# Patient Record
Sex: MALE | Race: WHITE | NOT HISPANIC OR LATINO | URBAN - METROPOLITAN AREA
[De-identification: names, ages, dates, MRNs, and addresses within clinical notes are randomized per-mention and may not be internally consistent; named-entity substitution may affect disease eponyms.]

---

## 2020-07-25 ENCOUNTER — TELEPHONE ENCOUNTER (OUTPATIENT)
Dept: URBAN - METROPOLITAN AREA CLINIC 13 | Facility: CLINIC | Age: 75
End: 2020-07-25

## 2020-07-25 RX ORDER — LEVOTHYROXINE SODIUM 0.1 MG/1
TAKE 1 TABLET DAILY TABLET ORAL
Refills: 0 | OUTPATIENT
Start: 2018-03-22 | End: 2019-06-20

## 2020-07-25 RX ORDER — MEXILETINE HYDROCHLORIDE 150 MG/1
TAKE 1 CAPSULE EVERY 8 HOURS DAILY CAPSULE ORAL
Refills: 0 | OUTPATIENT
End: 2019-02-27

## 2020-07-25 RX ORDER — AZELASTINE HYDROCHLORIDE 137 UG/1
INSTILL 2 SPRAYS IN EACH NOSTRIL ONCE DAILY SPRAY, METERED NASAL
Refills: 0 | OUTPATIENT
Start: 2018-10-31 | End: 2019-02-27

## 2020-07-25 RX ORDER — DUREZOL 0.5 MG/ML
EMULSION OPHTHALMIC
Refills: 0 | OUTPATIENT
End: 2019-06-20

## 2020-07-25 RX ORDER — INSULIN GLARGINE 100 [IU]/ML
INJECTION, SOLUTION SUBCUTANEOUS
Refills: 0 | OUTPATIENT
End: 2019-02-27

## 2020-07-25 RX ORDER — OMEPRAZOLE 20 MG/1
TAKE ONE CAPSULE BY MOUTH TWICE A DAY CAPSULE, DELAYED RELEASE ORAL
Refills: 2 | OUTPATIENT
Start: 2018-09-05 | End: 2019-06-20

## 2020-07-25 RX ORDER — THYROID, PORCINE 90 MG/1
TAKE 1 TABLET DAILY TABLET ORAL
Refills: 0 | OUTPATIENT
End: 2019-02-27

## 2020-07-25 RX ORDER — AMIODARONE HYDROCHLORIDE 200 MG/1
TAKE 2 TABLET DAILY TABLET ORAL
Refills: 0 | OUTPATIENT
Start: 2018-11-30 | End: 2019-06-20

## 2020-07-25 RX ORDER — INSULIN LISPRO 100 [IU]/ML
INJECTION, SUSPENSION SUBCUTANEOUS
Refills: 0 | OUTPATIENT
End: 2019-02-27

## 2020-07-26 ENCOUNTER — TELEPHONE ENCOUNTER (OUTPATIENT)
Dept: URBAN - METROPOLITAN AREA CLINIC 13 | Facility: CLINIC | Age: 75
End: 2020-07-26

## 2020-07-26 RX ORDER — TRAMADOL HYDROCHLORIDE 50 MG/1
TABLET ORAL
Qty: 15 | Refills: 0 | Status: ACTIVE | COMMUNITY
Start: 2019-05-01

## 2020-07-26 RX ORDER — INSULIN DETEMIR 100 [IU]/ML
INJECT SUBCUTANEOUSLY AS DIRECTED INJECTION, SOLUTION SUBCUTANEOUS
Refills: 0 | Status: ACTIVE | COMMUNITY
Start: 2018-10-02

## 2020-07-26 RX ORDER — CITALOPRAM 40 MG/1
TABLET ORAL
Qty: 90 | Refills: 0 | Status: ACTIVE | COMMUNITY
Start: 2019-12-13

## 2020-07-26 RX ORDER — INSULIN LISPRO 100 [IU]/ML
USE AS DIRECTED INJECTION, SOLUTION INTRAVENOUS; SUBCUTANEOUS
Refills: 0 | Status: ACTIVE | COMMUNITY

## 2020-07-26 RX ORDER — PREDNISOLONE ACETATE 10 MG/ML
SUSPENSION/ DROPS OPHTHALMIC
Qty: 5 | Refills: 0 | Status: ACTIVE | COMMUNITY
Start: 2019-11-26

## 2020-07-26 RX ORDER — FUROSEMIDE 20 MG/1
TAKE 1 TABLET DAILY AS NEEDED TABLET ORAL
Refills: 0 | Status: ACTIVE | COMMUNITY
Start: 2019-06-05

## 2020-07-26 RX ORDER — CITALOPRAM 20 MG/1
TABLET ORAL
Qty: 30 | Refills: 0 | Status: ACTIVE | COMMUNITY
Start: 2019-04-17

## 2020-07-26 RX ORDER — BUPROPION HYDROCHLORIDE 150 MG/1
TABLET, FILM COATED, EXTENDED RELEASE ORAL
Qty: 30 | Refills: 0 | Status: ACTIVE | COMMUNITY
Start: 2019-04-18

## 2020-07-26 RX ORDER — DUREZOL 0.5 MG/ML
EMULSION OPHTHALMIC
Qty: 5 | Refills: 0 | Status: ACTIVE | COMMUNITY
Start: 2017-12-18

## 2020-07-26 RX ORDER — TRAMADOL HYDROCHLORIDE 50 MG/1
TAKE 1 TABLET BY MOUTH EVERY 6 HOURS AS NEEDED FOR PAIN TABLET ORAL
Qty: 60 | Refills: 1 | Status: ACTIVE | COMMUNITY

## 2020-07-26 RX ORDER — LISINOPRIL 10 MG/1
TAKE 1 TABLET DAILY FOR BLOOD PRESSURE TABLET ORAL
Refills: 0 | Status: ACTIVE | COMMUNITY
Start: 2018-05-10

## 2020-07-26 RX ORDER — LEVOTHYROXINE SODIUM 0.09 MG/1
TABLET ORAL
Qty: 90 | Refills: 0 | Status: ACTIVE | COMMUNITY
Start: 2018-03-22

## 2020-07-26 RX ORDER — OMEPRAZOLE 40 MG/1
TAKE (1) CAPSULE BY MOUTH DAILY CAPSULE, DELAYED RELEASE ORAL
Refills: 2 | Status: ACTIVE | COMMUNITY
Start: 2019-07-29

## 2020-07-26 RX ORDER — ERYTHROMYCIN 5 MG/G
OINTMENT OPHTHALMIC
Qty: 4 | Refills: 0 | Status: ACTIVE | COMMUNITY
Start: 2019-03-20

## 2020-07-26 RX ORDER — ONDANSETRON HYDROCHLORIDE 4 MG/1
TABLET, FILM COATED ORAL
Qty: 90 | Refills: 0 | Status: ACTIVE | COMMUNITY
Start: 2019-12-13

## 2020-07-26 RX ORDER — DOXYCYCLINE 100 MG/1
TABLET, FILM COATED ORAL
Qty: 60 | Refills: 0 | Status: ACTIVE | COMMUNITY
Start: 2018-07-27

## 2020-07-26 RX ORDER — PANTOPRAZOLE SODIUM 40 MG/1
TABLET, DELAYED RELEASE ORAL
Qty: 30 | Refills: 0 | Status: ACTIVE | COMMUNITY
Start: 2020-01-31

## 2020-07-26 RX ORDER — HYDROCODONE BITARTRATE AND ACETAMINOPHEN 5; 325 MG/1; MG/1
TABLET ORAL
Qty: 7 | Refills: 0 | Status: ACTIVE | COMMUNITY
Start: 2019-03-20

## 2020-07-26 RX ORDER — LISINOPRIL 5 MG/1
TABLET ORAL
Qty: 30 | Refills: 0 | Status: ACTIVE | COMMUNITY
Start: 2020-01-31

## 2020-07-26 RX ORDER — CARVEDILOL 6.25 MG/1
TAKE 1 TABLET TWICE DAILY WITH MEALS TABLET, FILM COATED ORAL
Refills: 0 | Status: ACTIVE | COMMUNITY
Start: 2018-08-27

## 2020-07-26 RX ORDER — CLINDAMYCIN HYDROCHLORIDE 300 MG/1
CAPSULE ORAL
Qty: 10 | Refills: 0 | Status: ACTIVE | COMMUNITY
Start: 2019-03-20

## 2020-07-26 RX ORDER — ONDANSETRON 8 MG/1
TAKE 1 TABLET BY MOUTH THREE TIMES A DAY AS NEEDED TABLET ORAL
Refills: 0 | Status: ACTIVE | COMMUNITY
Start: 2019-02-20

## 2020-07-26 RX ORDER — CITALOPRAM 20 MG/1
TAKE ONE TABLET BY MOUTH EVERY DAY AS DIRECTED TABLET ORAL
Refills: 0 | Status: ACTIVE | COMMUNITY
Start: 2019-08-16

## 2020-07-26 RX ORDER — LEVOTHYROXINE, LIOTHYRONINE 57; 13.5 UG/1; UG/1
TAKE 1 TABLET DAILY TABLET ORAL
Refills: 0 | Status: ACTIVE | COMMUNITY

## 2020-07-26 RX ORDER — ERYTHROMYCIN 5 MG/G
OINTMENT OPHTHALMIC
Qty: 35 | Refills: 0 | Status: ACTIVE | COMMUNITY
Start: 2018-07-27

## 2020-07-26 RX ORDER — PRAVASTATIN SODIUM 80 MG/1
TAKE 1 TABLET DAILY TABLET ORAL
Refills: 0 | Status: ACTIVE | COMMUNITY
Start: 2018-04-12

## 2020-07-26 RX ORDER — CLONAZEPAM 0.5 MG/1
TAKE 1 TABLET EVERY 12 HOURS AS NEEDED TABLET ORAL
Refills: 0 | Status: ACTIVE | COMMUNITY

## 2020-07-26 RX ORDER — CLOPIDOGREL 75 MG/1
TAKE 1 TABLET DAILY TABLET ORAL
Refills: 0 | Status: ACTIVE | COMMUNITY
Start: 2018-05-10

## 2021-01-20 ENCOUNTER — WEB ENCOUNTER (OUTPATIENT)
Dept: URBAN - METROPOLITAN AREA CLINIC 113 | Facility: CLINIC | Age: 76
End: 2021-01-20

## 2021-01-20 ENCOUNTER — LAB OUTSIDE AN ENCOUNTER (OUTPATIENT)
Dept: URBAN - METROPOLITAN AREA CLINIC 113 | Facility: CLINIC | Age: 76
End: 2021-01-20

## 2021-01-20 ENCOUNTER — OFFICE VISIT (OUTPATIENT)
Dept: URBAN - METROPOLITAN AREA CLINIC 113 | Facility: CLINIC | Age: 76
End: 2021-01-20
Payer: MEDICARE

## 2021-01-20 VITALS
WEIGHT: 232 LBS | HEIGHT: 69 IN | HEART RATE: 65 BPM | BODY MASS INDEX: 34.36 KG/M2 | DIASTOLIC BLOOD PRESSURE: 66 MMHG | SYSTOLIC BLOOD PRESSURE: 120 MMHG | TEMPERATURE: 98.2 F

## 2021-01-20 DIAGNOSIS — D64.9 ANEMIA: ICD-10-CM

## 2021-01-20 PROCEDURE — G8427 DOCREV CUR MEDS BY ELIG CLIN: HCPCS | Performed by: NURSE PRACTITIONER

## 2021-01-20 PROCEDURE — 99214 OFFICE O/P EST MOD 30 MIN: CPT | Performed by: NURSE PRACTITIONER

## 2021-01-20 RX ORDER — CLONAZEPAM 0.5 MG/1
TAKE 1 TABLET EVERY 12 HOURS AS NEEDED TABLET ORAL
Refills: 0 | Status: ON HOLD | COMMUNITY

## 2021-01-20 RX ORDER — ONDANSETRON 8 MG/1
TAKE 1 TABLET BY MOUTH THREE TIMES A DAY AS NEEDED TABLET ORAL
Refills: 0 | Status: ON HOLD | COMMUNITY
Start: 2019-02-20

## 2021-01-20 RX ORDER — CARVEDILOL 6.25 MG/1
TAKE 1 TABLET TWICE DAILY WITH MEALS TABLET, FILM COATED ORAL
Refills: 0 | Status: ACTIVE | COMMUNITY
Start: 2018-08-27

## 2021-01-20 RX ORDER — LISINOPRIL 10 MG/1
TAKE 1 TABLET DAILY FOR BLOOD PRESSURE TABLET ORAL
Refills: 0 | Status: ON HOLD | COMMUNITY
Start: 2018-05-10

## 2021-01-20 RX ORDER — FUROSEMIDE 20 MG/1
TAKE 1 TABLET DAILY AS NEEDED TABLET ORAL
Refills: 0 | Status: ON HOLD | COMMUNITY
Start: 2019-06-05

## 2021-01-20 RX ORDER — TRAMADOL HYDROCHLORIDE 50 MG/1
TAKE 1 TABLET BY MOUTH EVERY 6 HOURS AS NEEDED FOR PAIN TABLET ORAL
Qty: 60 | Refills: 1 | Status: ACTIVE | COMMUNITY

## 2021-01-20 RX ORDER — DOXYCYCLINE 100 MG/1
TABLET, FILM COATED ORAL
Qty: 60 | Refills: 0 | Status: ON HOLD | COMMUNITY
Start: 2018-07-27

## 2021-01-20 RX ORDER — CHOLECALCIFEROL (VITAMIN D3) 125 MCG
1 TABLET 1.25MCG 50, TABLET ORAL ONCE A DAY
Status: ACTIVE | COMMUNITY

## 2021-01-20 RX ORDER — HYDROCODONE BITARTRATE AND ACETAMINOPHEN 5; 325 MG/1; MG/1
TABLET ORAL
Qty: 7 | Refills: 0 | Status: ON HOLD | COMMUNITY
Start: 2019-03-20

## 2021-01-20 RX ORDER — APIXABAN 2.5 MG/1
1 TABLET TABLET, FILM COATED ORAL TWICE DAILY
Status: ACTIVE | COMMUNITY

## 2021-01-20 RX ORDER — FUROSEMIDE 20 MG/1
1 TABLET PRN TABLET ORAL
Status: ACTIVE | COMMUNITY

## 2021-01-20 RX ORDER — ERYTHROMYCIN 5 MG/G
OINTMENT OPHTHALMIC
Qty: 35 | Refills: 0 | Status: ON HOLD | COMMUNITY
Start: 2018-07-27

## 2021-01-20 RX ORDER — OMEPRAZOLE 40 MG/1
TAKE (1) CAPSULE BY MOUTH DAILY CAPSULE, DELAYED RELEASE ORAL
Refills: 2 | Status: ON HOLD | COMMUNITY
Start: 2019-07-29

## 2021-01-20 RX ORDER — BUPROPION HYDROCHLORIDE 150 MG/1
TABLET, FILM COATED, EXTENDED RELEASE ORAL
Qty: 30 | Refills: 0 | Status: ON HOLD | COMMUNITY
Start: 2019-04-18

## 2021-01-20 RX ORDER — PANTOPRAZOLE SODIUM 40 MG/1
TABLET, DELAYED RELEASE ORAL
Qty: 30 | Refills: 0 | Status: ACTIVE | COMMUNITY
Start: 2020-01-31

## 2021-01-20 RX ORDER — DUREZOL 0.5 MG/ML
EMULSION OPHTHALMIC
Qty: 5 | Refills: 0 | Status: ON HOLD | COMMUNITY
Start: 2017-12-18

## 2021-01-20 RX ORDER — LISINOPRIL 5 MG/1
TABLET ORAL
Qty: 30 | Refills: 0 | Status: ON HOLD | COMMUNITY
Start: 2020-01-31

## 2021-01-20 RX ORDER — CITALOPRAM 20 MG/1
TABLET ORAL
Qty: 30 | Refills: 0 | Status: ON HOLD | COMMUNITY
Start: 2019-04-17

## 2021-01-20 RX ORDER — CLOPIDOGREL 75 MG/1
TAKE 1 TABLET DAILY TABLET ORAL
Refills: 0 | Status: ON HOLD | COMMUNITY
Start: 2018-05-10

## 2021-01-20 RX ORDER — SACUBITRIL AND VALSARTAN 24; 26 MG/1; MG/1
1 TABLET TABLET, FILM COATED ORAL TWICE A DAY
Status: ACTIVE | COMMUNITY

## 2021-01-20 RX ORDER — LEVOTHYROXINE SODIUM 150 UG/1
1 TABLET IN THE MORNING ON AN EMPTY STOMACH TABLET ORAL ONCE A DAY
Status: ACTIVE | COMMUNITY

## 2021-01-20 RX ORDER — LEVOTHYROXINE SODIUM 0.09 MG/1
TABLET ORAL
Qty: 90 | Refills: 0 | Status: ON HOLD | COMMUNITY
Start: 2018-03-22

## 2021-01-20 RX ORDER — PRAVASTATIN SODIUM 40 MG/1
1 TABLET TABLET ORAL ONCE A DAY
Refills: 0 | Status: ACTIVE | COMMUNITY
Start: 2018-04-12

## 2021-01-20 RX ORDER — PREDNISOLONE ACETATE 10 MG/ML
SUSPENSION/ DROPS OPHTHALMIC
Qty: 5 | Refills: 0 | Status: ON HOLD | COMMUNITY
Start: 2019-11-26

## 2021-01-20 RX ORDER — PREGABALIN 75 MG/1
1 CAPSULE CAPSULE ORAL TWICE DAILY
Status: ACTIVE | COMMUNITY

## 2021-01-20 RX ORDER — ONDANSETRON HYDROCHLORIDE 4 MG/1
TABLET, FILM COATED ORAL
Qty: 90 | Refills: 0 | Status: ON HOLD | COMMUNITY
Start: 2019-12-13

## 2021-01-20 RX ORDER — ONDANSETRON HYDROCHLORIDE 4 MG/1
1 TABLET PRN TABLET, FILM COATED ORAL
Status: ACTIVE | COMMUNITY

## 2021-01-20 RX ORDER — INSULIN LISPRO 100 [IU]/ML
USE AS DIRECTED INJECTION, SOLUTION INTRAVENOUS; SUBCUTANEOUS
Refills: 0 | Status: ON HOLD | COMMUNITY

## 2021-01-20 RX ORDER — LEVOTHYROXINE, LIOTHYRONINE 57; 13.5 UG/1; UG/1
TAKE 1 TABLET DAILY TABLET ORAL
Refills: 0 | Status: ON HOLD | COMMUNITY

## 2021-01-20 RX ORDER — INSULIN DETEMIR 100 [IU]/ML
INJECT SUBCUTANEOUSLY AS DIRECTED INJECTION, SOLUTION SUBCUTANEOUS
Refills: 0 | Status: ACTIVE | COMMUNITY
Start: 2018-10-02

## 2021-01-20 RX ORDER — PANTOPRAZOLE SODIUM 40 MG/1
1 TABLET TABLET, DELAYED RELEASE ORAL TWICE DAILY
Qty: 180 | Refills: 3
Start: 2020-01-31

## 2021-01-20 RX ORDER — CLINDAMYCIN HYDROCHLORIDE 300 MG/1
CAPSULE ORAL
Qty: 10 | Refills: 0 | Status: ON HOLD | COMMUNITY
Start: 2019-03-20

## 2021-01-20 RX ORDER — CITALOPRAM 40 MG/1
TABLET ORAL
Qty: 90 | Refills: 0 | Status: ACTIVE | COMMUNITY
Start: 2019-12-13

## 2021-01-20 NOTE — HPI-TODAY'S VISIT:
76-year-old male with a history of cirrhosis diagnosed incidentally during cholecystectomy, esophageal varices, presenting for evaluation of anemia.  He has been noted to have a decreased hemoglobin ongoing since June 2020. He denies any overt GI blood loss. He has been on blood thinners (Eliquis 2.5 mg BID, aspirin 81 mg daily). He had a cardiac ablation in November with Dr. Armas because his pacemaker and AICD were firing. This was associated with significant dizziness. He was told that his defibrillator was firing multiple times. Dizziness has since resolved.   He complains of regular nausea but not vomiting. He did have a recent GI illness with nausea and vomiting and fever. His PCP tested him for COVID, which was normal. No dysphagia. He does have heartburn, which is not completely resolved with pantoprazole 40 mg daily.  Tums as needed does relieve his heartburn. He complains of a left sided chest discomfort after meals, also relieved with Tums. Bowels are moving normally without any red blood per rectum.

## 2021-01-20 NOTE — HPI-OTHER HISTORIES
1/14/2021: WBC 6.5, hemoglobin 8.2, hematocrit 27.4, MCV 81, platelets 174,  A1c 9.2, TSH 0.14, BUN 11, creatinine 0.72, sodium 137, potassium 4.5,  T bili  0.6, , AST 25, ALT 22  1/7/21: WBC 8.2, hemoglobin 8.4, hematocrit 28.1, MCV 82, platelets 186  10/12/2020: WBC 5.8, hemoglobin 9.8, hematocrit 33, MCV 86, platelets 184 6/9/2020: WBC 6.3, hemoglobin 11.8, hematocrit 35.7, MCV 92, platelets 163

## 2021-02-01 ENCOUNTER — TELEPHONE ENCOUNTER (OUTPATIENT)
Dept: URBAN - METROPOLITAN AREA CLINIC 113 | Facility: CLINIC | Age: 76
End: 2021-02-01

## 2021-02-19 ENCOUNTER — OFFICE VISIT (OUTPATIENT)
Dept: URBAN - METROPOLITAN AREA MEDICAL CENTER 2 | Facility: MEDICAL CENTER | Age: 76
End: 2021-02-19
Payer: MEDICARE

## 2021-02-19 DIAGNOSIS — D50.8 ANEMIA, DUE TO INADEQUATE IRON INTAKE: ICD-10-CM

## 2021-02-19 DIAGNOSIS — I86.4 BLEEDING GASTRIC VARICES: ICD-10-CM

## 2021-02-19 DIAGNOSIS — K76.6 CLINICALLY SIGNIFICANT PORTAL HYPERTENSION: ICD-10-CM

## 2021-02-19 DIAGNOSIS — K31.89 ACQUIRED DEFORMITY OF DUODENUM: ICD-10-CM

## 2021-02-19 PROCEDURE — 43235 EGD DIAGNOSTIC BRUSH WASH: CPT | Performed by: INTERNAL MEDICINE

## 2021-02-19 RX ORDER — LEVOTHYROXINE, LIOTHYRONINE 57; 13.5 UG/1; UG/1
TAKE 1 TABLET DAILY TABLET ORAL
Refills: 0 | Status: ON HOLD | COMMUNITY

## 2021-02-19 RX ORDER — SACUBITRIL AND VALSARTAN 24; 26 MG/1; MG/1
1 TABLET TABLET, FILM COATED ORAL TWICE A DAY
Status: ACTIVE | COMMUNITY

## 2021-02-19 RX ORDER — ONDANSETRON HYDROCHLORIDE 4 MG/1
TABLET, FILM COATED ORAL
Qty: 90 | Refills: 0 | Status: ON HOLD | COMMUNITY
Start: 2019-12-13

## 2021-02-19 RX ORDER — INSULIN LISPRO 100 [IU]/ML
USE AS DIRECTED INJECTION, SOLUTION INTRAVENOUS; SUBCUTANEOUS
Refills: 0 | Status: ON HOLD | COMMUNITY

## 2021-02-19 RX ORDER — LISINOPRIL 5 MG/1
TABLET ORAL
Qty: 30 | Refills: 0 | Status: ON HOLD | COMMUNITY
Start: 2020-01-31

## 2021-02-19 RX ORDER — OMEPRAZOLE 40 MG/1
TAKE (1) CAPSULE BY MOUTH DAILY CAPSULE, DELAYED RELEASE ORAL
Refills: 2 | Status: ON HOLD | COMMUNITY
Start: 2019-07-29

## 2021-02-19 RX ORDER — ONDANSETRON HYDROCHLORIDE 4 MG/1
1 TABLET PRN TABLET, FILM COATED ORAL
Status: ACTIVE | COMMUNITY

## 2021-02-19 RX ORDER — PANTOPRAZOLE SODIUM 40 MG/1
1 TABLET TABLET, DELAYED RELEASE ORAL TWICE DAILY
Qty: 180 | Refills: 3 | Status: ACTIVE | COMMUNITY
Start: 2020-01-31

## 2021-02-19 RX ORDER — ONDANSETRON 8 MG/1
TAKE 1 TABLET BY MOUTH THREE TIMES A DAY AS NEEDED TABLET ORAL
Refills: 0 | Status: ON HOLD | COMMUNITY
Start: 2019-02-20

## 2021-02-19 RX ORDER — INSULIN DETEMIR 100 [IU]/ML
INJECT SUBCUTANEOUSLY AS DIRECTED INJECTION, SOLUTION SUBCUTANEOUS
Refills: 0 | Status: ACTIVE | COMMUNITY
Start: 2018-10-02

## 2021-02-19 RX ORDER — HYDROCODONE BITARTRATE AND ACETAMINOPHEN 5; 325 MG/1; MG/1
TABLET ORAL
Qty: 7 | Refills: 0 | Status: ON HOLD | COMMUNITY
Start: 2019-03-20

## 2021-02-19 RX ORDER — TRAMADOL HYDROCHLORIDE 50 MG/1
TAKE 1 TABLET BY MOUTH EVERY 6 HOURS AS NEEDED FOR PAIN TABLET ORAL
Qty: 60 | Refills: 1 | Status: ACTIVE | COMMUNITY

## 2021-02-19 RX ORDER — LEVOTHYROXINE SODIUM 150 UG/1
1 TABLET IN THE MORNING ON AN EMPTY STOMACH TABLET ORAL ONCE A DAY
Status: ACTIVE | COMMUNITY

## 2021-02-19 RX ORDER — DOXYCYCLINE 100 MG/1
TABLET, FILM COATED ORAL
Qty: 60 | Refills: 0 | Status: ON HOLD | COMMUNITY
Start: 2018-07-27

## 2021-02-19 RX ORDER — FUROSEMIDE 20 MG/1
1 TABLET PRN TABLET ORAL
Status: ACTIVE | COMMUNITY

## 2021-02-19 RX ORDER — CLOPIDOGREL 75 MG/1
TAKE 1 TABLET DAILY TABLET ORAL
Refills: 0 | Status: ON HOLD | COMMUNITY
Start: 2018-05-10

## 2021-02-19 RX ORDER — CITALOPRAM 20 MG/1
TABLET ORAL
Qty: 30 | Refills: 0 | Status: ON HOLD | COMMUNITY
Start: 2019-04-17

## 2021-02-19 RX ORDER — DUREZOL 0.5 MG/ML
EMULSION OPHTHALMIC
Qty: 5 | Refills: 0 | Status: ON HOLD | COMMUNITY
Start: 2017-12-18

## 2021-02-19 RX ORDER — CLINDAMYCIN HYDROCHLORIDE 300 MG/1
CAPSULE ORAL
Qty: 10 | Refills: 0 | Status: ON HOLD | COMMUNITY
Start: 2019-03-20

## 2021-02-19 RX ORDER — PREGABALIN 75 MG/1
1 CAPSULE CAPSULE ORAL TWICE DAILY
Status: ACTIVE | COMMUNITY

## 2021-02-19 RX ORDER — CARVEDILOL 6.25 MG/1
TAKE 1 TABLET TWICE DAILY WITH MEALS TABLET, FILM COATED ORAL
Refills: 0 | Status: ACTIVE | COMMUNITY
Start: 2018-08-27

## 2021-02-19 RX ORDER — APIXABAN 2.5 MG/1
1 TABLET TABLET, FILM COATED ORAL TWICE DAILY
Status: ACTIVE | COMMUNITY

## 2021-02-19 RX ORDER — LISINOPRIL 10 MG/1
TAKE 1 TABLET DAILY FOR BLOOD PRESSURE TABLET ORAL
Refills: 0 | Status: ON HOLD | COMMUNITY
Start: 2018-05-10

## 2021-02-19 RX ORDER — PRAVASTATIN SODIUM 40 MG/1
1 TABLET TABLET ORAL ONCE A DAY
Refills: 0 | Status: ACTIVE | COMMUNITY
Start: 2018-04-12

## 2021-02-19 RX ORDER — CLONAZEPAM 0.5 MG/1
TAKE 1 TABLET EVERY 12 HOURS AS NEEDED TABLET ORAL
Refills: 0 | Status: ON HOLD | COMMUNITY

## 2021-02-19 RX ORDER — BUPROPION HYDROCHLORIDE 150 MG/1
TABLET, FILM COATED, EXTENDED RELEASE ORAL
Qty: 30 | Refills: 0 | Status: ON HOLD | COMMUNITY
Start: 2019-04-18

## 2021-02-19 RX ORDER — ERYTHROMYCIN 5 MG/G
OINTMENT OPHTHALMIC
Qty: 35 | Refills: 0 | Status: ON HOLD | COMMUNITY
Start: 2018-07-27

## 2021-02-19 RX ORDER — PREDNISOLONE ACETATE 10 MG/ML
SUSPENSION/ DROPS OPHTHALMIC
Qty: 5 | Refills: 0 | Status: ON HOLD | COMMUNITY
Start: 2019-11-26

## 2021-02-19 RX ORDER — LEVOTHYROXINE SODIUM 0.09 MG/1
TABLET ORAL
Qty: 90 | Refills: 0 | Status: ON HOLD | COMMUNITY
Start: 2018-03-22

## 2021-02-19 RX ORDER — CITALOPRAM 40 MG/1
TABLET ORAL
Qty: 90 | Refills: 0 | Status: ACTIVE | COMMUNITY
Start: 2019-12-13

## 2021-02-19 RX ORDER — FUROSEMIDE 20 MG/1
TAKE 1 TABLET DAILY AS NEEDED TABLET ORAL
Refills: 0 | Status: ON HOLD | COMMUNITY
Start: 2019-06-05

## 2021-02-19 RX ORDER — CHOLECALCIFEROL (VITAMIN D3) 125 MCG
1 TABLET 1.25MCG 50, TABLET ORAL ONCE A DAY
Status: ACTIVE | COMMUNITY

## 2021-03-09 ENCOUNTER — OFFICE VISIT (OUTPATIENT)
Dept: URBAN - METROPOLITAN AREA CLINIC 113 | Facility: CLINIC | Age: 76
End: 2021-03-09
Payer: MEDICARE

## 2021-03-09 VITALS
SYSTOLIC BLOOD PRESSURE: 136 MMHG | WEIGHT: 234 LBS | HEART RATE: 77 BPM | RESPIRATION RATE: 18 BRPM | HEIGHT: 69 IN | BODY MASS INDEX: 34.66 KG/M2 | TEMPERATURE: 97.5 F | DIASTOLIC BLOOD PRESSURE: 76 MMHG

## 2021-03-09 DIAGNOSIS — D50.0 IRON DEFICIENCY ANEMIA DUE TO CHRONIC BLOOD LOSS: ICD-10-CM

## 2021-03-09 DIAGNOSIS — K74.69 OTHER CIRRHOSIS OF LIVER: ICD-10-CM

## 2021-03-09 PROCEDURE — 99214 OFFICE O/P EST MOD 30 MIN: CPT | Performed by: INTERNAL MEDICINE

## 2021-03-09 RX ORDER — HYDROCODONE BITARTRATE AND ACETAMINOPHEN 5; 325 MG/1; MG/1
TABLET ORAL
Qty: 7 | Refills: 0 | Status: ON HOLD | COMMUNITY
Start: 2019-03-20

## 2021-03-09 RX ORDER — PREGABALIN 75 MG/1
1 CAPSULE CAPSULE ORAL TWICE DAILY
Status: ACTIVE | COMMUNITY

## 2021-03-09 RX ORDER — PRAVASTATIN SODIUM 40 MG/1
1 TABLET TABLET ORAL ONCE A DAY
Refills: 0 | Status: ACTIVE | COMMUNITY
Start: 2018-04-12

## 2021-03-09 RX ORDER — ERYTHROMYCIN 5 MG/G
OINTMENT OPHTHALMIC
Qty: 35 | Refills: 0 | Status: ON HOLD | COMMUNITY
Start: 2018-07-27

## 2021-03-09 RX ORDER — TRAMADOL HYDROCHLORIDE 50 MG/1
TAKE 1 TABLET BY MOUTH EVERY 6 HOURS AS NEEDED FOR PAIN TABLET ORAL
Qty: 60 | Refills: 1 | Status: ACTIVE | COMMUNITY

## 2021-03-09 RX ORDER — LEVOTHYROXINE, LIOTHYRONINE 57; 13.5 UG/1; UG/1
TAKE 1 TABLET DAILY TABLET ORAL
Refills: 0 | Status: ON HOLD | COMMUNITY

## 2021-03-09 RX ORDER — DOXYCYCLINE 100 MG/1
TABLET, FILM COATED ORAL
Qty: 60 | Refills: 0 | Status: ON HOLD | COMMUNITY
Start: 2018-07-27

## 2021-03-09 RX ORDER — CLONAZEPAM 0.5 MG/1
TAKE 1 TABLET EVERY 12 HOURS AS NEEDED TABLET ORAL
Refills: 0 | Status: ON HOLD | COMMUNITY

## 2021-03-09 RX ORDER — LEVOTHYROXINE SODIUM 150 UG/1
1 TABLET IN THE MORNING ON AN EMPTY STOMACH TABLET ORAL ONCE A DAY
Status: ACTIVE | COMMUNITY

## 2021-03-09 RX ORDER — APIXABAN 2.5 MG/1
1 TABLET TABLET, FILM COATED ORAL TWICE DAILY
Status: ACTIVE | COMMUNITY

## 2021-03-09 RX ORDER — CLOPIDOGREL 75 MG/1
TAKE 1 TABLET DAILY TABLET ORAL
Refills: 0 | Status: ON HOLD | COMMUNITY
Start: 2018-05-10

## 2021-03-09 RX ORDER — PREDNISOLONE ACETATE 10 MG/ML
SUSPENSION/ DROPS OPHTHALMIC
Qty: 5 | Refills: 0 | Status: ON HOLD | COMMUNITY
Start: 2019-11-26

## 2021-03-09 RX ORDER — PANTOPRAZOLE SODIUM 40 MG/1
1 TABLET TABLET, DELAYED RELEASE ORAL ONCE A DAY
Status: ACTIVE | COMMUNITY

## 2021-03-09 RX ORDER — PANTOPRAZOLE SODIUM 40 MG/1
1 TABLET TABLET, DELAYED RELEASE ORAL TWICE DAILY
Qty: 180 | Refills: 3 | Status: ON HOLD | COMMUNITY
Start: 2020-01-31

## 2021-03-09 RX ORDER — ONDANSETRON HYDROCHLORIDE 4 MG/1
TABLET, FILM COATED ORAL
Qty: 90 | Refills: 0 | Status: ON HOLD | COMMUNITY
Start: 2019-12-13

## 2021-03-09 RX ORDER — CHOLECALCIFEROL (VITAMIN D3) 125 MCG
1 TABLET 1.25MCG 50, TABLET ORAL ONCE A DAY
Status: ON HOLD | COMMUNITY

## 2021-03-09 RX ORDER — CLINDAMYCIN HYDROCHLORIDE 300 MG/1
CAPSULE ORAL
Qty: 10 | Refills: 0 | Status: ON HOLD | COMMUNITY
Start: 2019-03-20

## 2021-03-09 RX ORDER — FUROSEMIDE 20 MG/1
TAKE 1 TABLET DAILY AS NEEDED TABLET ORAL
Refills: 0 | Status: ON HOLD | COMMUNITY
Start: 2019-06-05

## 2021-03-09 RX ORDER — CITALOPRAM 20 MG/1
TABLET ORAL
Qty: 30 | Refills: 0 | Status: ON HOLD | COMMUNITY
Start: 2019-04-17

## 2021-03-09 RX ORDER — INSULIN LISPRO 100 [IU]/ML
USE AS DIRECTED INJECTION, SOLUTION INTRAVENOUS; SUBCUTANEOUS
Refills: 0 | Status: ON HOLD | COMMUNITY

## 2021-03-09 RX ORDER — DULAGLUTIDE 1.5 MG/.5ML
AS DIRECTED INJECTION, SOLUTION SUBCUTANEOUS
Status: ACTIVE | COMMUNITY

## 2021-03-09 RX ORDER — LISINOPRIL 5 MG/1
TABLET ORAL
Qty: 30 | Refills: 0 | Status: ON HOLD | COMMUNITY
Start: 2020-01-31

## 2021-03-09 RX ORDER — FUROSEMIDE 20 MG/1
1 TABLET PRN TABLET ORAL
Status: ON HOLD | COMMUNITY

## 2021-03-09 RX ORDER — LEVOTHYROXINE SODIUM 0.09 MG/1
TABLET ORAL
Qty: 90 | Refills: 0 | Status: ON HOLD | COMMUNITY
Start: 2018-03-22

## 2021-03-09 RX ORDER — CARVEDILOL 6.25 MG/1
TAKE 1 TABLET TWICE DAILY WITH MEALS TABLET, FILM COATED ORAL
Refills: 0 | Status: ON HOLD | COMMUNITY
Start: 2018-08-27

## 2021-03-09 RX ORDER — ONDANSETRON HYDROCHLORIDE 4 MG/1
1 TABLET PRN TABLET, FILM COATED ORAL
Status: ON HOLD | COMMUNITY

## 2021-03-09 RX ORDER — DUREZOL 0.5 MG/ML
EMULSION OPHTHALMIC
Qty: 5 | Refills: 0 | Status: ON HOLD | COMMUNITY
Start: 2017-12-18

## 2021-03-09 RX ORDER — ONDANSETRON 8 MG/1
TAKE 1 TABLET BY MOUTH THREE TIMES A DAY AS NEEDED TABLET ORAL
Refills: 0 | Status: ON HOLD | COMMUNITY
Start: 2019-02-20

## 2021-03-09 RX ORDER — INSULIN DETEMIR 100 [IU]/ML
INJECT SUBCUTANEOUSLY AS DIRECTED INJECTION, SOLUTION SUBCUTANEOUS
Refills: 0 | Status: ACTIVE | COMMUNITY
Start: 2018-10-02

## 2021-03-09 RX ORDER — LISINOPRIL 10 MG/1
TAKE 1 TABLET DAILY FOR BLOOD PRESSURE TABLET ORAL
Refills: 0 | Status: ON HOLD | COMMUNITY
Start: 2018-05-10

## 2021-03-09 RX ORDER — CITALOPRAM 40 MG/1
TABLET ORAL
Qty: 90 | Refills: 0 | Status: ON HOLD | COMMUNITY
Start: 2019-12-13

## 2021-03-09 RX ORDER — OMEPRAZOLE 40 MG/1
TAKE (1) CAPSULE BY MOUTH DAILY CAPSULE, DELAYED RELEASE ORAL
Refills: 2 | Status: ON HOLD | COMMUNITY
Start: 2019-07-29

## 2021-03-09 RX ORDER — CARVEDILOL 6.25 MG/1
1 TABLET WITH FOOD TABLET, FILM COATED ORAL TWICE A DAY
Status: ACTIVE | COMMUNITY

## 2021-03-09 RX ORDER — BUPROPION HYDROCHLORIDE 150 MG/1
TABLET, FILM COATED, EXTENDED RELEASE ORAL
Qty: 30 | Refills: 0 | Status: ON HOLD | COMMUNITY
Start: 2019-04-18

## 2021-03-09 RX ORDER — SACUBITRIL AND VALSARTAN 24; 26 MG/1; MG/1
1 TABLET TABLET, FILM COATED ORAL TWICE A DAY
Status: ACTIVE | COMMUNITY

## 2021-03-09 NOTE — HPI-OTHER HISTORIES
she returns today in follow-up.  He reports that he is doing well.  Feels that he is stronger.  No bleeding.  No nausea or vomiting.  He is watching sodium in his diet.  EGD results reviewed with him.  That showed gastric varices and polyps and gastropathy without significant esophageal varices.  Lab is reviewed.  His ferritin was 15 confirming iron deficiency anemia.  He has never had a colonoscopy.  He is not sure that he wants 1.  He is taking his iron supplementation currently.

## 2021-05-18 ENCOUNTER — OFFICE VISIT (OUTPATIENT)
Dept: URBAN - METROPOLITAN AREA CLINIC 113 | Facility: CLINIC | Age: 76
End: 2021-05-18

## 2021-05-18 RX ORDER — LEVOTHYROXINE SODIUM 150 UG/1
1 TABLET IN THE MORNING ON AN EMPTY STOMACH TABLET ORAL ONCE A DAY
Status: ACTIVE | COMMUNITY

## 2021-05-18 RX ORDER — PANTOPRAZOLE SODIUM 40 MG/1
1 TABLET TABLET, DELAYED RELEASE ORAL ONCE A DAY
Status: ACTIVE | COMMUNITY

## 2021-05-18 RX ORDER — FUROSEMIDE 20 MG/1
TAKE 1 TABLET DAILY AS NEEDED TABLET ORAL
Refills: 0 | Status: ON HOLD | COMMUNITY
Start: 2019-06-05

## 2021-05-18 RX ORDER — CLONAZEPAM 0.5 MG/1
TAKE 1 TABLET EVERY 12 HOURS AS NEEDED TABLET ORAL
Refills: 0 | Status: ON HOLD | COMMUNITY

## 2021-05-18 RX ORDER — TRAMADOL HYDROCHLORIDE 50 MG/1
TAKE 1 TABLET BY MOUTH EVERY 6 HOURS AS NEEDED FOR PAIN TABLET ORAL
Qty: 60 | Refills: 1 | Status: ACTIVE | COMMUNITY

## 2021-05-18 RX ORDER — LEVOTHYROXINE, LIOTHYRONINE 57; 13.5 UG/1; UG/1
TAKE 1 TABLET DAILY TABLET ORAL
Refills: 0 | Status: ON HOLD | COMMUNITY

## 2021-05-18 RX ORDER — PREDNISOLONE ACETATE 10 MG/ML
SUSPENSION/ DROPS OPHTHALMIC
Qty: 5 | Refills: 0 | Status: ON HOLD | COMMUNITY
Start: 2019-11-26

## 2021-05-18 RX ORDER — INSULIN DETEMIR 100 [IU]/ML
INJECT SUBCUTANEOUSLY AS DIRECTED INJECTION, SOLUTION SUBCUTANEOUS
Refills: 0 | Status: ACTIVE | COMMUNITY
Start: 2018-10-02

## 2021-05-18 RX ORDER — CARVEDILOL 6.25 MG/1
1 TABLET WITH FOOD TABLET, FILM COATED ORAL TWICE A DAY
Status: ACTIVE | COMMUNITY

## 2021-05-18 RX ORDER — LISINOPRIL 10 MG/1
TAKE 1 TABLET DAILY FOR BLOOD PRESSURE TABLET ORAL
Refills: 0 | Status: ON HOLD | COMMUNITY
Start: 2018-05-10

## 2021-05-18 RX ORDER — HYDROCODONE BITARTRATE AND ACETAMINOPHEN 5; 325 MG/1; MG/1
TABLET ORAL
Qty: 7 | Refills: 0 | Status: ON HOLD | COMMUNITY
Start: 2019-03-20

## 2021-05-18 RX ORDER — ONDANSETRON HYDROCHLORIDE 4 MG/1
1 TABLET PRN TABLET, FILM COATED ORAL
Status: ON HOLD | COMMUNITY

## 2021-05-18 RX ORDER — PANTOPRAZOLE SODIUM 40 MG/1
1 TABLET TABLET, DELAYED RELEASE ORAL TWICE DAILY
Qty: 180 | Refills: 3 | Status: ON HOLD | COMMUNITY
Start: 2020-01-31

## 2021-05-18 RX ORDER — FUROSEMIDE 20 MG/1
1 TABLET PRN TABLET ORAL
Status: ON HOLD | COMMUNITY

## 2021-05-18 RX ORDER — CITALOPRAM 20 MG/1
TABLET ORAL
Qty: 30 | Refills: 0 | Status: ON HOLD | COMMUNITY
Start: 2019-04-17

## 2021-05-18 RX ORDER — DULAGLUTIDE 1.5 MG/.5ML
AS DIRECTED INJECTION, SOLUTION SUBCUTANEOUS
Status: ACTIVE | COMMUNITY

## 2021-05-18 RX ORDER — LEVOTHYROXINE SODIUM 0.09 MG/1
TABLET ORAL
Qty: 90 | Refills: 0 | Status: ON HOLD | COMMUNITY
Start: 2018-03-22

## 2021-05-18 RX ORDER — DUREZOL 0.5 MG/ML
EMULSION OPHTHALMIC
Qty: 5 | Refills: 0 | Status: ON HOLD | COMMUNITY
Start: 2017-12-18

## 2021-05-18 RX ORDER — LISINOPRIL 5 MG/1
TABLET ORAL
Qty: 30 | Refills: 0 | Status: ON HOLD | COMMUNITY
Start: 2020-01-31

## 2021-05-18 RX ORDER — ONDANSETRON 8 MG/1
TAKE 1 TABLET BY MOUTH THREE TIMES A DAY AS NEEDED TABLET ORAL
Refills: 0 | Status: ON HOLD | COMMUNITY
Start: 2019-02-20

## 2021-05-18 RX ORDER — ONDANSETRON HYDROCHLORIDE 4 MG/1
TABLET, FILM COATED ORAL
Qty: 90 | Refills: 0 | Status: ON HOLD | COMMUNITY
Start: 2019-12-13

## 2021-05-18 RX ORDER — CLINDAMYCIN HYDROCHLORIDE 300 MG/1
CAPSULE ORAL
Qty: 10 | Refills: 0 | Status: ON HOLD | COMMUNITY
Start: 2019-03-20

## 2021-05-18 RX ORDER — CARVEDILOL 6.25 MG/1
TAKE 1 TABLET TWICE DAILY WITH MEALS TABLET, FILM COATED ORAL
Refills: 0 | Status: ON HOLD | COMMUNITY
Start: 2018-08-27

## 2021-05-18 RX ORDER — INSULIN LISPRO 100 [IU]/ML
USE AS DIRECTED INJECTION, SOLUTION INTRAVENOUS; SUBCUTANEOUS
Refills: 0 | Status: ON HOLD | COMMUNITY

## 2021-05-18 RX ORDER — DOXYCYCLINE 100 MG/1
TABLET, FILM COATED ORAL
Qty: 60 | Refills: 0 | Status: ON HOLD | COMMUNITY
Start: 2018-07-27

## 2021-05-18 RX ORDER — CHOLECALCIFEROL (VITAMIN D3) 125 MCG
1 TABLET 1.25MCG 50, TABLET ORAL ONCE A DAY
Status: ON HOLD | COMMUNITY

## 2021-05-18 RX ORDER — OMEPRAZOLE 40 MG/1
TAKE (1) CAPSULE BY MOUTH DAILY CAPSULE, DELAYED RELEASE ORAL
Refills: 2 | Status: ON HOLD | COMMUNITY
Start: 2019-07-29

## 2021-05-18 RX ORDER — PREGABALIN 75 MG/1
1 CAPSULE CAPSULE ORAL TWICE DAILY
Status: ACTIVE | COMMUNITY

## 2021-05-18 RX ORDER — ERYTHROMYCIN 5 MG/G
OINTMENT OPHTHALMIC
Qty: 35 | Refills: 0 | Status: ON HOLD | COMMUNITY
Start: 2018-07-27

## 2021-05-18 RX ORDER — APIXABAN 2.5 MG/1
1 TABLET TABLET, FILM COATED ORAL TWICE DAILY
Status: ACTIVE | COMMUNITY

## 2021-05-18 RX ORDER — BUPROPION HYDROCHLORIDE 150 MG/1
TABLET, FILM COATED, EXTENDED RELEASE ORAL
Qty: 30 | Refills: 0 | Status: ON HOLD | COMMUNITY
Start: 2019-04-18

## 2021-05-18 RX ORDER — PRAVASTATIN SODIUM 40 MG/1
1 TABLET TABLET ORAL ONCE A DAY
Refills: 0 | Status: ACTIVE | COMMUNITY
Start: 2018-04-12

## 2021-05-18 RX ORDER — CITALOPRAM 40 MG/1
TABLET ORAL
Qty: 90 | Refills: 0 | Status: ON HOLD | COMMUNITY
Start: 2019-12-13

## 2021-05-18 RX ORDER — CLOPIDOGREL 75 MG/1
TAKE 1 TABLET DAILY TABLET ORAL
Refills: 0 | Status: ON HOLD | COMMUNITY
Start: 2018-05-10

## 2021-05-18 RX ORDER — SACUBITRIL AND VALSARTAN 24; 26 MG/1; MG/1
1 TABLET TABLET, FILM COATED ORAL TWICE A DAY
Status: ACTIVE | COMMUNITY

## 2021-08-24 ENCOUNTER — LAB OUTSIDE AN ENCOUNTER (OUTPATIENT)
Dept: URBAN - METROPOLITAN AREA CLINIC 113 | Facility: CLINIC | Age: 76
End: 2021-08-24

## 2021-08-24 ENCOUNTER — OFFICE VISIT (OUTPATIENT)
Dept: URBAN - METROPOLITAN AREA CLINIC 113 | Facility: CLINIC | Age: 76
End: 2021-08-24
Payer: MEDICARE

## 2021-08-24 ENCOUNTER — WEB ENCOUNTER (OUTPATIENT)
Dept: URBAN - METROPOLITAN AREA CLINIC 113 | Facility: CLINIC | Age: 76
End: 2021-08-24

## 2021-08-24 VITALS
HEART RATE: 73 BPM | WEIGHT: 237 LBS | SYSTOLIC BLOOD PRESSURE: 103 MMHG | HEIGHT: 69 IN | DIASTOLIC BLOOD PRESSURE: 56 MMHG | TEMPERATURE: 98.7 F | BODY MASS INDEX: 35.1 KG/M2

## 2021-08-24 DIAGNOSIS — D50.0 IRON DEFICIENCY ANEMIA DUE TO CHRONIC BLOOD LOSS: ICD-10-CM

## 2021-08-24 DIAGNOSIS — K74.60 CIRRHOSIS: ICD-10-CM

## 2021-08-24 PROBLEM — 255417007 CIRRHOTIC: Status: ACTIVE | Noted: 2021-08-24

## 2021-08-24 PROBLEM — 303082009 PORTAL HYPERTENSIVE GASTROPATHY: Status: ACTIVE | Noted: 2021-08-24

## 2021-08-24 PROBLEM — 88111009 ALTERED BOWEL FUNCTION: Status: ACTIVE | Noted: 2021-08-24

## 2021-08-24 PROCEDURE — 99214 OFFICE O/P EST MOD 30 MIN: CPT | Performed by: INTERNAL MEDICINE

## 2021-08-24 RX ORDER — SODIUM, POTASSIUM,MAG SULFATES 17.5-3.13G
354 ML SOLUTION, RECONSTITUTED, ORAL ORAL ONCE
Qty: 354 MILLILITER | Refills: 0 | OUTPATIENT
Start: 2021-08-24 | End: 2021-08-25

## 2021-08-24 RX ORDER — PREDNISOLONE ACETATE 10 MG/ML
SUSPENSION/ DROPS OPHTHALMIC
Qty: 5 | Refills: 0 | Status: ON HOLD | COMMUNITY
Start: 2019-11-26

## 2021-08-24 RX ORDER — PANTOPRAZOLE SODIUM 40 MG/1
1 TABLET TABLET, DELAYED RELEASE ORAL TWICE DAILY
Status: ACTIVE | COMMUNITY

## 2021-08-24 RX ORDER — CARVEDILOL 6.25 MG/1
1 TABLET WITH FOOD TABLET, FILM COATED ORAL TWICE A DAY
Status: ACTIVE | COMMUNITY

## 2021-08-24 RX ORDER — CITALOPRAM 20 MG/1
TABLET ORAL
Qty: 30 | Refills: 0 | Status: ON HOLD | COMMUNITY
Start: 2019-04-17

## 2021-08-24 RX ORDER — DULAGLUTIDE 1.5 MG/.5ML
AS DIRECTED INJECTION, SOLUTION SUBCUTANEOUS
Status: ON HOLD | COMMUNITY

## 2021-08-24 RX ORDER — HYDROCODONE BITARTRATE AND ACETAMINOPHEN 5; 325 MG/1; MG/1
TABLET ORAL
Qty: 7 | Refills: 0 | Status: ON HOLD | COMMUNITY
Start: 2019-03-20

## 2021-08-24 RX ORDER — INSULIN DETEMIR 100 [IU]/ML
INJECT SUBCUTANEOUSLY AS DIRECTED INJECTION, SOLUTION SUBCUTANEOUS
Refills: 0 | Status: ACTIVE | COMMUNITY
Start: 2018-10-02

## 2021-08-24 RX ORDER — DOXYCYCLINE 100 MG/1
TABLET, FILM COATED ORAL
Qty: 60 | Refills: 0 | Status: ON HOLD | COMMUNITY
Start: 2018-07-27

## 2021-08-24 RX ORDER — CLINDAMYCIN HYDROCHLORIDE 300 MG/1
CAPSULE ORAL
Qty: 10 | Refills: 0 | Status: ON HOLD | COMMUNITY
Start: 2019-03-20

## 2021-08-24 RX ORDER — SACUBITRIL AND VALSARTAN 24; 26 MG/1; MG/1
1 TABLET TABLET, FILM COATED ORAL TWICE A DAY
Status: ACTIVE | COMMUNITY

## 2021-08-24 RX ORDER — LISINOPRIL 5 MG/1
TABLET ORAL
Qty: 30 | Refills: 0 | Status: ON HOLD | COMMUNITY
Start: 2020-01-31

## 2021-08-24 RX ORDER — LISINOPRIL 10 MG/1
TAKE 1 TABLET DAILY FOR BLOOD PRESSURE TABLET ORAL
Refills: 0 | Status: ON HOLD | COMMUNITY
Start: 2018-05-10

## 2021-08-24 RX ORDER — CHOLECALCIFEROL (VITAMIN D3) 125 MCG
1 TABLET 1.25MCG 50, TABLET ORAL ONCE A DAY
Status: ACTIVE | COMMUNITY

## 2021-08-24 RX ORDER — LEVOTHYROXINE SODIUM 0.09 MG/1
TABLET ORAL
Qty: 90 | Refills: 0 | Status: ON HOLD | COMMUNITY
Start: 2018-03-22

## 2021-08-24 RX ORDER — ONDANSETRON HYDROCHLORIDE 4 MG/1
1 TABLET PRN TABLET, FILM COATED ORAL
Status: ON HOLD | COMMUNITY

## 2021-08-24 RX ORDER — PREGABALIN 150 MG/1
1 CAPSULE CAPSULE ORAL ONCE A DAY
Status: ACTIVE | COMMUNITY

## 2021-08-24 RX ORDER — INSULIN LISPRO 100 [IU]/ML
USE AS DIRECTED INJECTION, SOLUTION INTRAVENOUS; SUBCUTANEOUS
Refills: 0 | Status: ON HOLD | COMMUNITY

## 2021-08-24 RX ORDER — LEVOTHYROXINE SODIUM 100 UG/1
1 TABLET IN THE MORNING ON AN EMPTY STOMACH TABLET ORAL ONCE A DAY
Status: ACTIVE | COMMUNITY

## 2021-08-24 RX ORDER — FUROSEMIDE 20 MG/1
TAKE 1 TABLET DAILY AS NEEDED TABLET ORAL
Refills: 0 | Status: ON HOLD | COMMUNITY
Start: 2019-06-05

## 2021-08-24 RX ORDER — PRAVASTATIN SODIUM 40 MG/1
1 TABLET TABLET ORAL ONCE A DAY
Refills: 0 | Status: ACTIVE | COMMUNITY
Start: 2018-04-12

## 2021-08-24 RX ORDER — BUPROPION HYDROCHLORIDE 150 MG/1
TABLET, FILM COATED, EXTENDED RELEASE ORAL
Qty: 30 | Refills: 0 | Status: ON HOLD | COMMUNITY
Start: 2019-04-18

## 2021-08-24 RX ORDER — LEVOTHYROXINE, LIOTHYRONINE 57; 13.5 UG/1; UG/1
TAKE 1 TABLET DAILY TABLET ORAL
Refills: 0 | Status: ON HOLD | COMMUNITY

## 2021-08-24 RX ORDER — APIXABAN 2.5 MG/1
1 TABLET TABLET, FILM COATED ORAL ONCE DAILY
Status: ACTIVE | COMMUNITY

## 2021-08-24 RX ORDER — CITALOPRAM 40 MG/1
TABLET ORAL
Qty: 90 | Refills: 0 | Status: ON HOLD | COMMUNITY
Start: 2019-12-13

## 2021-08-24 RX ORDER — PANTOPRAZOLE SODIUM 40 MG/1
1 TABLET TABLET, DELAYED RELEASE ORAL TWICE DAILY
Qty: 180 | Refills: 3 | Status: ON HOLD | COMMUNITY
Start: 2020-01-31

## 2021-08-24 RX ORDER — STANDARDIZED SENNA CONCENTRATE 8.6 MG/1
2 TABLETS AT BEDTIME AS NEEDED TABLET ORAL ONCE A DAY
Status: ACTIVE | COMMUNITY

## 2021-08-24 RX ORDER — DUREZOL 0.5 MG/ML
EMULSION OPHTHALMIC
Qty: 5 | Refills: 0 | Status: ON HOLD | COMMUNITY
Start: 2017-12-18

## 2021-08-24 RX ORDER — FUROSEMIDE 20 MG/1
1 TABLET PRN TABLET ORAL
Status: ON HOLD | COMMUNITY

## 2021-08-24 RX ORDER — TRAMADOL HYDROCHLORIDE 50 MG/1
TAKE 1 TABLET BY MOUTH EVERY 6 HOURS AS NEEDED FOR PAIN TABLET ORAL
Qty: 60 | Refills: 1 | Status: ACTIVE | COMMUNITY

## 2021-08-24 RX ORDER — CLONAZEPAM 0.5 MG/1
TAKE 1 TABLET EVERY 12 HOURS AS NEEDED TABLET ORAL
Refills: 0 | Status: ON HOLD | COMMUNITY

## 2021-08-24 RX ORDER — ONDANSETRON 8 MG/1
TAKE 1 TABLET BY MOUTH THREE TIMES A DAY AS NEEDED TABLET ORAL
Refills: 0 | Status: ON HOLD | COMMUNITY
Start: 2019-02-20

## 2021-08-24 RX ORDER — ONDANSETRON HYDROCHLORIDE 4 MG/1
TABLET, FILM COATED ORAL
Qty: 90 | Refills: 0 | Status: ON HOLD | COMMUNITY
Start: 2019-12-13

## 2021-08-24 RX ORDER — PREGABALIN 75 MG/1
1 CAPSULE CAPSULE ORAL TWICE DAILY
Status: ACTIVE | COMMUNITY

## 2021-08-24 RX ORDER — CLOPIDOGREL 75 MG/1
TAKE 1 TABLET DAILY TABLET ORAL
Refills: 0 | Status: ON HOLD | COMMUNITY
Start: 2018-05-10

## 2021-08-24 RX ORDER — ERYTHROMYCIN 5 MG/G
OINTMENT OPHTHALMIC
Qty: 35 | Refills: 0 | Status: ON HOLD | COMMUNITY
Start: 2018-07-27

## 2021-08-24 RX ORDER — CARVEDILOL 6.25 MG/1
TAKE 1 TABLET TWICE DAILY WITH MEALS TABLET, FILM COATED ORAL
Refills: 0 | Status: ON HOLD | COMMUNITY
Start: 2018-08-27

## 2021-08-24 RX ORDER — OMEPRAZOLE 40 MG/1
TAKE (1) CAPSULE BY MOUTH DAILY CAPSULE, DELAYED RELEASE ORAL
Refills: 2 | Status: ON HOLD | COMMUNITY
Start: 2019-07-29

## 2021-08-24 NOTE — HPI-TODAY'S VISIT:
76-year-old male with a history of cirrhosis diagnosed incidentally during cholecystectomy, esophageal varices, presenting for evaluation of constipation. . Recently, he has had difficulties with altered bowel habits.  Gets quite a bit of urgency.  On 2 separate occasions he is passed out prior to having a bowel movement.  Likely vasovagal.  No reports of rectal bleeding or melena.  No previous colonoscopy.  History of significant iron deficiency anemia. . EGD February 2021.  Type II gastroesophageal varices.  No stigmata.  Mild portal hypertensive gastropathy . He had a cardiac ablation in November 2020 with Dr. Armas because his pacemaker and AICD were firing. This was associated with significant dizziness. He was told that his defibrillator was firing multiple times.  . 1/14/2021: WBC 6.5, hemoglobin 8.2, hematocrit 27.4, MCV 81, platelets 174,  A1c 9.2, TSH 0.14, BUN 11, creatinine 0.72, sodium 137, potassium 4.5,  T bili  0.6, , AST 25, ALT 22 1/7/21: WBC 8.2, hemoglobin 8.4, hematocrit 28.1, MCV 82, platelets 186 10/12/2020: WBC 5.8, hemoglobin 9.8, hematocrit 33, MCV 86, platelets 184 6/9/2020: WBC 6.3, hemoglobin 11.8, hematocrit 35.7, MCV 92, platelets 163  . Review of record Labs 1/4/19: H/H 14.6/44, MCV 93, Plt 195, WBC 6.1. AST 49, ALT 51, , Tbili 0.7, CMP otherwise unremarkabel aside from glucose 183. TSH 6.22.  CT of the abdomen and pelvis w/ w/o contrast was performed on 2/5/19. Findings included significantly elevated liver density suggesting excess mineral deposition, probably related to amiodarone medication therapy. A component of primary or secondary hemochromatosis cannot be excluded. Anatomic configuration of the liver suggesting chronic hepatocellular disease and potential early cirrhosis with suggestion of recanalization of the umbilical vein raising concern for portal hypertension. No findings of liver neoplasm. Also notable for redemonstrated cholelithiasis and borderline clinically significant fusiform ectasia of the infrarenal abdominal aorta with diameter of 2.9cm. . Abdominal ultrasound was performed on 1/18/19. Findings included cholelithiasis without evidence of gallbladder wall thickening, pericholecystic fluid, or ductal dilatation. Also notable for coarsened liver echogenicity and increased prominence of the left lobe versus the right, concerning for chronic hepatocellular disease. No findings of liver neoplasm.

## 2022-01-21 ENCOUNTER — TELEPHONE ENCOUNTER (OUTPATIENT)
Dept: URBAN - METROPOLITAN AREA CLINIC 113 | Facility: CLINIC | Age: 77
End: 2022-01-21

## 2022-01-21 RX ORDER — POLYETHYLENE GLYCOL 3350, SODIUM CHLORIDE, SODIUM BICARBONATE, POTASSIUM CHLORIDE 420; 11.2; 5.72; 1.48 G/4L; G/4L; G/4L; G/4L
AS DIRECTED POWDER, FOR SOLUTION ORAL AS DIRECTED
Qty: 1 BOTTLE | Refills: 0 | OUTPATIENT
Start: 2022-01-21 | End: 2022-01-22

## 2022-01-27 PROBLEM — 724556004: Status: ACTIVE | Noted: 2021-03-09

## 2022-02-02 ENCOUNTER — OFFICE VISIT (OUTPATIENT)
Dept: URBAN - METROPOLITAN AREA MEDICAL CENTER 19 | Facility: MEDICAL CENTER | Age: 77
End: 2022-02-02
Payer: MEDICARE

## 2022-02-02 DIAGNOSIS — Q43.8 REDUNDANT COLON: ICD-10-CM

## 2022-02-02 DIAGNOSIS — D50.0 ANEMIA, IRON DEFICIENCY FROM CHRONIC BLOOD: ICD-10-CM

## 2022-02-02 PROCEDURE — 45378 DIAGNOSTIC COLONOSCOPY: CPT | Performed by: INTERNAL MEDICINE

## 2022-07-21 ENCOUNTER — CLAIMS CREATED FROM THE CLAIM WINDOW (OUTPATIENT)
Dept: URBAN - METROPOLITAN AREA MEDICAL CENTER 19 | Facility: MEDICAL CENTER | Age: 77
End: 2022-07-21
Payer: MEDICARE

## 2022-07-21 DIAGNOSIS — I25.5 ISCHEMIC CARDIOMYOPATHY: ICD-10-CM

## 2022-07-21 DIAGNOSIS — A49.1 STREPTOCOCCAL INFECTION, UNSPECIFIED SITE: ICD-10-CM

## 2022-07-21 DIAGNOSIS — I47.2 VENTRICULAR TACHYCARDIA: ICD-10-CM

## 2022-07-21 DIAGNOSIS — Z95.810 AICD (AUTOMATIC CARDIOVERTER/DEFIBRILLATOR) PRESENT: ICD-10-CM

## 2022-07-21 PROCEDURE — 99253 IP/OBS CNSLTJ NEW/EST LOW 45: CPT | Performed by: INTERNAL MEDICINE

## 2022-07-21 PROCEDURE — 99221 1ST HOSP IP/OBS SF/LOW 40: CPT | Performed by: INTERNAL MEDICINE

## 2022-08-01 PROBLEM — 25569003 VENTRICULAR TACHYCARDIA: Status: ACTIVE | Noted: 2022-08-01

## 2022-08-01 PROBLEM — 443325000 AUTOMATIC IMPLANTABLE CARDIAC DEFIBRILLATOR IN SITU: Status: ACTIVE | Noted: 2022-08-01

## 2022-08-01 PROBLEM — 194849004 ISCHEMIC CARDIOMYOPATHY: Status: ACTIVE | Noted: 2022-08-01

## 2023-05-03 ENCOUNTER — TELEPHONE ENCOUNTER (OUTPATIENT)
Dept: URBAN - METROPOLITAN AREA CLINIC 113 | Facility: CLINIC | Age: 78
End: 2023-05-03

## 2023-06-27 ENCOUNTER — DASHBOARD ENCOUNTERS (OUTPATIENT)
Age: 78
End: 2023-06-27

## 2023-06-27 ENCOUNTER — OFFICE VISIT (OUTPATIENT)
Dept: URBAN - METROPOLITAN AREA CLINIC 113 | Facility: CLINIC | Age: 78
End: 2023-06-27

## 2023-06-27 RX ORDER — INSULIN DETEMIR 100 [IU]/ML
INJECT SUBCUTANEOUSLY AS DIRECTED INJECTION, SOLUTION SUBCUTANEOUS
Refills: 0 | Status: ACTIVE | COMMUNITY
Start: 2018-10-02

## 2023-06-27 RX ORDER — FUROSEMIDE 20 MG/1
1 TABLET PRN TABLET ORAL
Status: ON HOLD | COMMUNITY

## 2023-06-27 RX ORDER — CHOLECALCIFEROL (VITAMIN D3) 125 MCG
1 TABLET 1.25MCG 50, TABLET ORAL ONCE A DAY
Status: ACTIVE | COMMUNITY

## 2023-06-27 RX ORDER — DUREZOL 0.5 MG/ML
EMULSION OPHTHALMIC
Qty: 5 | Refills: 0 | Status: ON HOLD | COMMUNITY
Start: 2017-12-18

## 2023-06-27 RX ORDER — APIXABAN 2.5 MG/1
1 TABLET TABLET, FILM COATED ORAL ONCE DAILY
Status: ACTIVE | COMMUNITY

## 2023-06-27 RX ORDER — ONDANSETRON HYDROCHLORIDE 4 MG/1
1 TABLET PRN TABLET, FILM COATED ORAL
Status: ON HOLD | COMMUNITY

## 2023-06-27 RX ORDER — LEVOTHYROXINE SODIUM 100 UG/1
1 TABLET IN THE MORNING ON AN EMPTY STOMACH TABLET ORAL ONCE A DAY
Status: ACTIVE | COMMUNITY

## 2023-06-27 RX ORDER — PANTOPRAZOLE SODIUM 40 MG/1
1 TABLET TABLET, DELAYED RELEASE ORAL TWICE DAILY
Qty: 180 | Refills: 3 | Status: ON HOLD | COMMUNITY
Start: 2020-01-31

## 2023-06-27 RX ORDER — DULAGLUTIDE 1.5 MG/.5ML
AS DIRECTED INJECTION, SOLUTION SUBCUTANEOUS
Status: ON HOLD | COMMUNITY

## 2023-06-27 RX ORDER — FUROSEMIDE 20 MG/1
TAKE 1 TABLET DAILY AS NEEDED TABLET ORAL
Refills: 0 | Status: ON HOLD | COMMUNITY
Start: 2019-06-05

## 2023-06-27 RX ORDER — SACUBITRIL AND VALSARTAN 24; 26 MG/1; MG/1
1 TABLET TABLET, FILM COATED ORAL TWICE A DAY
Status: ACTIVE | COMMUNITY

## 2023-06-27 RX ORDER — CLOPIDOGREL 75 MG/1
TAKE 1 TABLET DAILY TABLET ORAL
Refills: 0 | Status: ON HOLD | COMMUNITY
Start: 2018-05-10

## 2023-06-27 RX ORDER — LISINOPRIL 5 MG/1
TABLET ORAL
Qty: 30 | Refills: 0 | Status: ON HOLD | COMMUNITY
Start: 2020-01-31

## 2023-06-27 RX ORDER — PANTOPRAZOLE SODIUM 40 MG/1
1 TABLET TABLET, DELAYED RELEASE ORAL TWICE DAILY
Status: ACTIVE | COMMUNITY

## 2023-06-27 RX ORDER — CARVEDILOL 6.25 MG/1
TAKE 1 TABLET TWICE DAILY WITH MEALS TABLET, FILM COATED ORAL
Refills: 0 | Status: ON HOLD | COMMUNITY
Start: 2018-08-27

## 2023-06-27 RX ORDER — INSULIN LISPRO 100 [IU]/ML
USE AS DIRECTED INJECTION, SOLUTION INTRAVENOUS; SUBCUTANEOUS
Refills: 0 | Status: ON HOLD | COMMUNITY

## 2023-06-27 RX ORDER — LISINOPRIL 10 MG/1
TAKE 1 TABLET DAILY FOR BLOOD PRESSURE TABLET ORAL
Refills: 0 | Status: ON HOLD | COMMUNITY
Start: 2018-05-10

## 2023-06-27 RX ORDER — OMEPRAZOLE 40 MG/1
TAKE (1) CAPSULE BY MOUTH DAILY CAPSULE, DELAYED RELEASE ORAL
Refills: 2 | Status: ON HOLD | COMMUNITY
Start: 2019-07-29

## 2023-06-27 RX ORDER — DOXYCYCLINE 100 MG/1
TABLET, FILM COATED ORAL
Qty: 60 | Refills: 0 | Status: ON HOLD | COMMUNITY
Start: 2018-07-27

## 2023-06-27 RX ORDER — CLONAZEPAM 0.5 MG/1
TAKE 1 TABLET EVERY 12 HOURS AS NEEDED TABLET ORAL
Refills: 0 | Status: ON HOLD | COMMUNITY

## 2023-06-27 RX ORDER — CLINDAMYCIN HYDROCHLORIDE 300 MG/1
CAPSULE ORAL
Qty: 10 | Refills: 0 | Status: ON HOLD | COMMUNITY
Start: 2019-03-20

## 2023-06-27 RX ORDER — CITALOPRAM 20 MG/1
TABLET ORAL
Qty: 30 | Refills: 0 | Status: ON HOLD | COMMUNITY
Start: 2019-04-17

## 2023-06-27 RX ORDER — PREGABALIN 150 MG/1
1 CAPSULE CAPSULE ORAL ONCE A DAY
Status: ACTIVE | COMMUNITY

## 2023-06-27 RX ORDER — STANDARDIZED SENNA CONCENTRATE 8.6 MG/1
2 TABLETS AT BEDTIME AS NEEDED TABLET ORAL ONCE A DAY
Status: ACTIVE | COMMUNITY

## 2023-06-27 RX ORDER — PREDNISOLONE ACETATE 10 MG/ML
SUSPENSION/ DROPS OPHTHALMIC
Qty: 5 | Refills: 0 | Status: ON HOLD | COMMUNITY
Start: 2019-11-26

## 2023-06-27 RX ORDER — ONDANSETRON HYDROCHLORIDE 4 MG/1
TABLET, FILM COATED ORAL
Qty: 90 | Refills: 0 | Status: ON HOLD | COMMUNITY
Start: 2019-12-13

## 2023-06-27 RX ORDER — HYDROCODONE BITARTRATE AND ACETAMINOPHEN 5; 325 MG/1; MG/1
TABLET ORAL
Qty: 7 | Refills: 0 | Status: ON HOLD | COMMUNITY
Start: 2019-03-20

## 2023-06-27 RX ORDER — TRAMADOL HYDROCHLORIDE 50 MG/1
TAKE 1 TABLET BY MOUTH EVERY 6 HOURS AS NEEDED FOR PAIN TABLET ORAL
Qty: 60 | Refills: 1 | Status: ACTIVE | COMMUNITY

## 2023-06-27 RX ORDER — LEVOTHYROXINE SODIUM 0.09 MG/1
TABLET ORAL
Qty: 90 | Refills: 0 | Status: ON HOLD | COMMUNITY
Start: 2018-03-22

## 2023-06-27 RX ORDER — ERYTHROMYCIN 5 MG/G
OINTMENT OPHTHALMIC
Qty: 35 | Refills: 0 | Status: ON HOLD | COMMUNITY
Start: 2018-07-27

## 2023-06-27 RX ORDER — BUPROPION HYDROCHLORIDE 150 MG/1
TABLET, FILM COATED, EXTENDED RELEASE ORAL
Qty: 30 | Refills: 0 | Status: ON HOLD | COMMUNITY
Start: 2019-04-18

## 2023-06-27 RX ORDER — ONDANSETRON 8 MG/1
TAKE 1 TABLET BY MOUTH THREE TIMES A DAY AS NEEDED TABLET ORAL
Refills: 0 | Status: ON HOLD | COMMUNITY
Start: 2019-02-20

## 2023-06-27 RX ORDER — PRAVASTATIN SODIUM 40 MG/1
1 TABLET TABLET ORAL ONCE A DAY
Refills: 0 | Status: ACTIVE | COMMUNITY
Start: 2018-04-12

## 2023-06-27 RX ORDER — CARVEDILOL 6.25 MG/1
1 TABLET WITH FOOD TABLET, FILM COATED ORAL TWICE A DAY
Status: ACTIVE | COMMUNITY

## 2023-06-27 RX ORDER — LEVOTHYROXINE, LIOTHYRONINE 57; 13.5 UG/1; UG/1
TAKE 1 TABLET DAILY TABLET ORAL
Refills: 0 | Status: ON HOLD | COMMUNITY

## 2023-06-27 RX ORDER — PREGABALIN 75 MG/1
1 CAPSULE CAPSULE ORAL TWICE DAILY
Status: ACTIVE | COMMUNITY

## 2023-06-27 RX ORDER — CITALOPRAM 40 MG/1
TABLET ORAL
Qty: 90 | Refills: 0 | Status: ON HOLD | COMMUNITY
Start: 2019-12-13

## 2023-07-19 NOTE — PHYSICAL EXAM GASTROINTESTINAL
soft, nontender, nondistended , normal bowel sounds Quality 47: Advance Care Plan: Advance Care Planning discussed and documented in the medical record; patient did not wish or was not able to name a surrogate decision maker or provide an advance care plan. Quality 226: Preventive Care And Screening: Tobacco Use: Screening And Cessation Intervention: Patient screened for tobacco use and is an ex/non-smoker Detail Level: Detailed Quality 130: Documentation Of Current Medications In The Medical Record: Current Medications Documented Quality 431: Preventive Care And Screening: Unhealthy Alcohol Use - Screening: Patient not identified as an unhealthy alcohol user when screened for unhealthy alcohol use using a systematic screening method

## 2023-09-05 NOTE — PHYSICAL EXAM CARDIOVASCULAR:
No protocol for requested medication     Last office visit date: 7/5/23  Preferred pharmacy: [facility]    Order pended, routed to clinician for review.    regular rate and rhythm , no murmurs, no lower extremity edema